# Patient Record
Sex: FEMALE | Race: WHITE
[De-identification: names, ages, dates, MRNs, and addresses within clinical notes are randomized per-mention and may not be internally consistent; named-entity substitution may affect disease eponyms.]

---

## 2019-08-22 ENCOUNTER — HOSPITAL ENCOUNTER (INPATIENT)
Dept: HOSPITAL 19 - MEDICAL | Age: 84
LOS: 5 days | Discharge: NURSING, REHAB OR LONG-TERM CARE | DRG: 853 | End: 2019-08-27
Attending: INTERNAL MEDICINE | Admitting: INTERNAL MEDICINE
Payer: MEDICAID

## 2019-08-22 VITALS — TEMPERATURE: 98.1 F | DIASTOLIC BLOOD PRESSURE: 78 MMHG | HEART RATE: 118 BPM | SYSTOLIC BLOOD PRESSURE: 148 MMHG

## 2019-08-22 VITALS — DIASTOLIC BLOOD PRESSURE: 46 MMHG | SYSTOLIC BLOOD PRESSURE: 92 MMHG | HEART RATE: 107 BPM

## 2019-08-22 VITALS — BODY MASS INDEX: 38.02 KG/M2 | HEIGHT: 65 IN | WEIGHT: 228.18 LBS

## 2019-08-22 VITALS — TEMPERATURE: 97.1 F | HEART RATE: 118 BPM | SYSTOLIC BLOOD PRESSURE: 148 MMHG | DIASTOLIC BLOOD PRESSURE: 78 MMHG

## 2019-08-22 VITALS — TEMPERATURE: 98 F | HEART RATE: 108 BPM | SYSTOLIC BLOOD PRESSURE: 112 MMHG | DIASTOLIC BLOOD PRESSURE: 78 MMHG

## 2019-08-22 VITALS — TEMPERATURE: 98 F | HEART RATE: 104 BPM | DIASTOLIC BLOOD PRESSURE: 77 MMHG | SYSTOLIC BLOOD PRESSURE: 134 MMHG

## 2019-08-22 VITALS — DIASTOLIC BLOOD PRESSURE: 72 MMHG | SYSTOLIC BLOOD PRESSURE: 135 MMHG | HEART RATE: 102 BPM | TEMPERATURE: 97.8 F

## 2019-08-22 DIAGNOSIS — A41.9: Primary | ICD-10-CM

## 2019-08-22 DIAGNOSIS — I44.0: ICD-10-CM

## 2019-08-22 DIAGNOSIS — N39.0: ICD-10-CM
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
## 2019-08-22 DIAGNOSIS — I50.9: ICD-10-CM

## 2019-08-22 DIAGNOSIS — Z79.1: ICD-10-CM

## 2019-08-22 DIAGNOSIS — N17.9: ICD-10-CM

## 2019-08-22 DIAGNOSIS — E11.9: ICD-10-CM

## 2019-08-22 DIAGNOSIS — R65.20: ICD-10-CM

## 2019-08-22 DIAGNOSIS — I10: ICD-10-CM

## 2019-08-22 DIAGNOSIS — J96.01: ICD-10-CM

## 2019-08-22 DIAGNOSIS — J18.9: ICD-10-CM

## 2019-08-22 DIAGNOSIS — N13.2: ICD-10-CM

## 2019-08-22 DIAGNOSIS — R53.81: ICD-10-CM

## 2019-08-22 DIAGNOSIS — Z66: ICD-10-CM

## 2019-08-22 DIAGNOSIS — I11.0: ICD-10-CM

## 2019-08-22 PROCEDURE — A9284 NON-ELECTRONIC SPIROMETER: HCPCS

## 2019-08-22 PROCEDURE — 0T768DZ DILATION OF RIGHT URETER WITH INTRALUMINAL DEVICE, VIA NATURAL OR ARTIFICIAL OPENING ENDOSCOPIC: ICD-10-PCS | Performed by: UROLOGY

## 2019-08-22 PROCEDURE — A4314 CATH W/DRAINAGE 2-WAY LATEX: HCPCS

## 2019-08-22 PROCEDURE — C1769 GUIDE WIRE: HCPCS

## 2019-08-22 PROCEDURE — C2617 STENT, NON-COR, TEM W/O DEL: HCPCS

## 2019-08-22 PROCEDURE — 0T778DZ DILATION OF LEFT URETER WITH INTRALUMINAL DEVICE, VIA NATURAL OR ARTIFICIAL OPENING ENDOSCOPIC: ICD-10-PCS | Performed by: UROLOGY

## 2019-08-22 PROCEDURE — BT1DYZZ FLUOROSCOPY OF RIGHT KIDNEY, URETER AND BLADDER USING OTHER CONTRAST: ICD-10-PCS | Performed by: UROLOGY

## 2019-08-23 VITALS — HEART RATE: 103 BPM | TEMPERATURE: 98 F | SYSTOLIC BLOOD PRESSURE: 114 MMHG | DIASTOLIC BLOOD PRESSURE: 63 MMHG

## 2019-08-23 VITALS — DIASTOLIC BLOOD PRESSURE: 83 MMHG | TEMPERATURE: 97.6 F | SYSTOLIC BLOOD PRESSURE: 152 MMHG | HEART RATE: 105 BPM

## 2019-08-23 VITALS — SYSTOLIC BLOOD PRESSURE: 115 MMHG | DIASTOLIC BLOOD PRESSURE: 68 MMHG | HEART RATE: 105 BPM

## 2019-08-23 VITALS — HEART RATE: 105 BPM | TEMPERATURE: 98.4 F | DIASTOLIC BLOOD PRESSURE: 73 MMHG | SYSTOLIC BLOOD PRESSURE: 144 MMHG

## 2019-08-23 VITALS — HEART RATE: 103 BPM | SYSTOLIC BLOOD PRESSURE: 108 MMHG | DIASTOLIC BLOOD PRESSURE: 61 MMHG

## 2019-08-23 VITALS — SYSTOLIC BLOOD PRESSURE: 142 MMHG | TEMPERATURE: 98.3 F | HEART RATE: 104 BPM | DIASTOLIC BLOOD PRESSURE: 76 MMHG

## 2019-08-23 VITALS — DIASTOLIC BLOOD PRESSURE: 75 MMHG | TEMPERATURE: 98.8 F | HEART RATE: 105 BPM | SYSTOLIC BLOOD PRESSURE: 146 MMHG

## 2019-08-23 VITALS — DIASTOLIC BLOOD PRESSURE: 65 MMHG | TEMPERATURE: 98 F | HEART RATE: 107 BPM | SYSTOLIC BLOOD PRESSURE: 118 MMHG

## 2019-08-23 VITALS — SYSTOLIC BLOOD PRESSURE: 124 MMHG | TEMPERATURE: 97.3 F | DIASTOLIC BLOOD PRESSURE: 62 MMHG | HEART RATE: 102 BPM

## 2019-08-23 LAB
ALBUMIN SERPL-MCNC: 3.3 GM/DL (ref 3.5–5)
ALP SERPL-CCNC: 71 U/L (ref 50–136)
ALT SERPL-CCNC: < 6 U/L (ref 9–52)
ANION GAP SERPL CALC-SCNC: 9 MMOL/L (ref 7–16)
AST SERPL-CCNC: 31 U/L (ref 15–37)
BILIRUB SERPL-MCNC: 1.1 MG/DL (ref 0–1)
BUN SERPL-MCNC: 35 MG/DL (ref 7–17)
CALCIUM SERPL-MCNC: 8.4 MG/DL (ref 8.4–10.2)
CHLORIDE SERPL-SCNC: 100 MMOL/L (ref 98–107)
CO2 SERPL-SCNC: 26 MMOL/L (ref 22–30)
CREAT SERPL-SCNC: 1.17 UMOL/L (ref 0.52–1.25)
ERYTHROCYTE [DISTWIDTH] IN BLOOD BY AUTOMATED COUNT: 14.1 % (ref 11.5–14.5)
GLUCOSE SERPL-MCNC: 193 MG/DL (ref 74–106)
HCT VFR BLD AUTO: 39.3 % (ref 37–47)
HGB BLD-MCNC: 13.1 G/DL (ref 12.5–16)
LYMPHOCYTES NFR BLD MANUAL: 3 % (ref 20–51)
MCH RBC QN AUTO: 32 PG (ref 27–31)
MCHC RBC AUTO-ENTMCNC: 33 G/DL (ref 33–37)
MCV RBC AUTO: 96 FL (ref 80–100)
MONOCYTES NFR BLD: 5 % (ref 1.7–9.3)
NEUTS SEG NFR BLD MANUAL: 92 % (ref 42–75.2)
PLATELET # BLD AUTO: 267 K/MM3 (ref 130–400)
PLATELET BLD QL SMEAR: NORMAL
PMV BLD AUTO: 9.6 FL (ref 7.4–10.4)
POTASSIUM SERPL-SCNC: 3.7 MMOL/L (ref 3.4–5)
PROT SERPL-MCNC: 6.4 GM/DL (ref 6.4–8.2)
RBC # BLD AUTO: 4.08 M/MM3 (ref 4.1–5.3)
SODIUM SERPL-SCNC: 136 MMOL/L (ref 137–145)

## 2019-08-23 NOTE — NUR
Margot from [facility] reports they can accept the pt upon discharge. SW will
continue to follow.

## 2019-08-23 NOTE — NUR
SW met with the patient to discuss a discharge plan. The pt lives in [facility] at [facility]. SW presented the patient choice form and pt's first and
only choice is [facility]. A copy was provided to the pt and original was
placed in the chart. The pt reports nursing assists with ADLs and pt uses a
walker. The pt was not sure who her [facility] was and [facility]. The pt does not have advanced directives in the
EMR. RASHMI requested copy from Margot at [facility]. SW to fax updates to
[facility]. SW will continue to follow to assist with discharge.

## 2019-08-23 NOTE — NUR
IV to right forarm discontinued d/t infiltration, catheter tip intact.  warm
pack applied to extremity. IV restarted to Left forarm.

## 2019-08-23 NOTE — NUR
Patient in bed resting. Alert and oriented to self. Shift assessment complete.
Patient denies pain at this time. Daughters at bedside. Paiz to dependent
drainage with clear yellow urine present in bag. SCDs to BLE. Denies further
needs at this time.

## 2019-08-23 NOTE — NUR
Report received. Assumed care for night shift. Assessment complete. VS stable.
Denies pain. IV to Left forearm is infiltrated-leaking. Antibiotics are
currently due-will administer after new site access. Apiz cath draining dark
yellow urine-sediment. Encouraged to drink fluids-provided with 240mls of
water. Tolerated well. Denies nausea. Discussed PM medications and plan of
care. Denies questions but states she is exhausted. Call light in reach. Bed
in low position/wheels locked. Bed alarm on. Will monitor.

## 2019-08-23 NOTE — NUR
Patient has done well throughout the day. Paiz maintained to dependent
drainage with  clear yellow urine present in bag. Denies pain at this time. IV
fluids infusing to left forarm IV. Denies further needs at this time. Will
report off to night shift.

## 2019-08-24 VITALS — HEART RATE: 104 BPM | TEMPERATURE: 98.6 F | SYSTOLIC BLOOD PRESSURE: 148 MMHG | DIASTOLIC BLOOD PRESSURE: 80 MMHG

## 2019-08-24 VITALS — DIASTOLIC BLOOD PRESSURE: 79 MMHG | TEMPERATURE: 98.4 F | SYSTOLIC BLOOD PRESSURE: 153 MMHG | HEART RATE: 97 BPM

## 2019-08-24 VITALS — SYSTOLIC BLOOD PRESSURE: 157 MMHG | DIASTOLIC BLOOD PRESSURE: 87 MMHG | HEART RATE: 91 BPM | TEMPERATURE: 97.8 F

## 2019-08-24 VITALS — DIASTOLIC BLOOD PRESSURE: 88 MMHG | HEART RATE: 97 BPM | SYSTOLIC BLOOD PRESSURE: 174 MMHG | TEMPERATURE: 98.2 F

## 2019-08-24 VITALS — SYSTOLIC BLOOD PRESSURE: 156 MMHG | TEMPERATURE: 98.3 F | HEART RATE: 101 BPM | DIASTOLIC BLOOD PRESSURE: 85 MMHG

## 2019-08-24 VITALS — TEMPERATURE: 98.1 F | DIASTOLIC BLOOD PRESSURE: 94 MMHG | SYSTOLIC BLOOD PRESSURE: 159 MMHG | HEART RATE: 96 BPM

## 2019-08-24 LAB
ALBUMIN SERPL-MCNC: 3.1 GM/DL (ref 3.5–5)
ALP SERPL-CCNC: 69 U/L (ref 50–136)
ALT SERPL-CCNC: < 6 U/L (ref 9–52)
ANION GAP SERPL CALC-SCNC: 9 MMOL/L (ref 7–16)
AST SERPL-CCNC: 21 U/L (ref 15–37)
BASOPHILS # BLD: 0 10*3/UL (ref 0–0.2)
BASOPHILS NFR BLD AUTO: 0.2 % (ref 0–2)
BILIRUB SERPL-MCNC: 1 MG/DL (ref 0–1)
BUN SERPL-MCNC: 26 MG/DL (ref 7–17)
CALCIUM SERPL-MCNC: 8.3 MG/DL (ref 8.4–10.2)
CHLORIDE SERPL-SCNC: 100 MMOL/L (ref 98–107)
CO2 SERPL-SCNC: 28 MMOL/L (ref 22–30)
CREAT SERPL-SCNC: 0.87 UMOL/L (ref 0.52–1.25)
EOSINOPHIL # BLD: 0 10*3/UL (ref 0–0.7)
EOSINOPHIL NFR BLD: 0 % (ref 0–4)
ERYTHROCYTE [DISTWIDTH] IN BLOOD BY AUTOMATED COUNT: 13.8 % (ref 11.5–14.5)
GLUCOSE SERPL-MCNC: 147 MG/DL (ref 74–106)
GRANULOCYTES # BLD AUTO: 89.9 % (ref 42.2–75.2)
HCT VFR BLD AUTO: 38.4 % (ref 37–47)
HGB BLD-MCNC: 13 G/DL (ref 12.5–16)
LYMPHOCYTES # BLD: 0.7 10*3/UL (ref 1.2–3.4)
LYMPHOCYTES NFR BLD: 3.2 % (ref 20–51)
MCH RBC QN AUTO: 32 PG (ref 27–31)
MCHC RBC AUTO-ENTMCNC: 34 G/DL (ref 33–37)
MCV RBC AUTO: 94 FL (ref 80–100)
MONOCYTES # BLD: 1.1 10*3/UL (ref 0.1–0.6)
MONOCYTES NFR BLD AUTO: 5.4 % (ref 1.7–9.3)
NEUTROPHILS # BLD: 18.8 10*3/UL (ref 1.4–6.5)
PLATELET # BLD AUTO: 250 K/MM3 (ref 130–400)
PMV BLD AUTO: 9.3 FL (ref 7.4–10.4)
POTASSIUM SERPL-SCNC: 3.1 MMOL/L (ref 3.4–5)
PROT SERPL-MCNC: 6.2 GM/DL (ref 6.4–8.2)
RBC # BLD AUTO: 4.07 M/MM3 (ref 4.1–5.3)
SODIUM SERPL-SCNC: 136 MMOL/L (ref 137–145)

## 2019-08-24 NOTE — NUR
Patient has done well throughout the day. Sat up in recliner most of the day.
Paiz maintained to dependent drainage with cloudy yellow urine present in
bag. Refuses supper this afternoon, states she is not hungry. Denies pain or
further needs at this time. Will report off to night shift.

## 2019-08-24 NOTE — NUR
Pt. laying in bed at this time.  Pt. is alert to self and pleasently confused.
Assessment complete.  IV to rt. forearm patent, IV fluids infusing per orders.
Paiz catheter to DD, cloudy yellow urine noted.  Pt. denies pain or other
needs, call light within reach.

## 2019-08-24 NOTE — NUR
Has rested well this shift. VS remained stable. Denies pain. No nausea. Paiz
with dark yellow urine-less sediment this shift. Remained oriented to self.
Bed alarm on/call light within reach. Will monitor.

## 2019-08-25 VITALS — HEART RATE: 87 BPM | TEMPERATURE: 98 F | DIASTOLIC BLOOD PRESSURE: 72 MMHG | SYSTOLIC BLOOD PRESSURE: 139 MMHG

## 2019-08-25 VITALS — DIASTOLIC BLOOD PRESSURE: 74 MMHG | TEMPERATURE: 98.1 F | SYSTOLIC BLOOD PRESSURE: 146 MMHG | HEART RATE: 95 BPM

## 2019-08-25 VITALS — TEMPERATURE: 98 F | DIASTOLIC BLOOD PRESSURE: 74 MMHG | SYSTOLIC BLOOD PRESSURE: 139 MMHG | HEART RATE: 93 BPM

## 2019-08-25 VITALS — DIASTOLIC BLOOD PRESSURE: 85 MMHG | TEMPERATURE: 97.8 F | HEART RATE: 89 BPM | SYSTOLIC BLOOD PRESSURE: 158 MMHG

## 2019-08-25 VITALS — HEART RATE: 85 BPM | SYSTOLIC BLOOD PRESSURE: 148 MMHG | DIASTOLIC BLOOD PRESSURE: 78 MMHG | TEMPERATURE: 97.6 F

## 2019-08-25 VITALS — DIASTOLIC BLOOD PRESSURE: 78 MMHG | SYSTOLIC BLOOD PRESSURE: 154 MMHG | TEMPERATURE: 98.3 F | HEART RATE: 94 BPM

## 2019-08-25 LAB
ALBUMIN SERPL-MCNC: 3.1 GM/DL (ref 3.5–5)
ALP SERPL-CCNC: 71 U/L (ref 50–136)
ALT SERPL-CCNC: < 6 U/L (ref 9–52)
ANION GAP SERPL CALC-SCNC: 8 MMOL/L (ref 7–16)
AST SERPL-CCNC: 18 U/L (ref 15–37)
BASOPHILS # BLD: 0.1 10*3/UL (ref 0–0.2)
BASOPHILS NFR BLD AUTO: 0.4 % (ref 0–2)
BILIRUB SERPL-MCNC: 0.9 MG/DL (ref 0–1)
BUN SERPL-MCNC: 23 MG/DL (ref 7–17)
CALCIUM SERPL-MCNC: 8.1 MG/DL (ref 8.4–10.2)
CHLORIDE SERPL-SCNC: 97 MMOL/L (ref 98–107)
CO2 SERPL-SCNC: 30 MMOL/L (ref 22–30)
CREAT SERPL-SCNC: 0.92 UMOL/L (ref 0.52–1.25)
EOSINOPHIL # BLD: 0.1 10*3/UL (ref 0–0.7)
EOSINOPHIL NFR BLD: 0.3 % (ref 0–4)
ERYTHROCYTE [DISTWIDTH] IN BLOOD BY AUTOMATED COUNT: 13.7 % (ref 11.5–14.5)
GLUCOSE SERPL-MCNC: 148 MG/DL (ref 74–106)
GRANULOCYTES # BLD AUTO: 83.4 % (ref 42.2–75.2)
HCT VFR BLD AUTO: 39 % (ref 37–47)
HGB BLD-MCNC: 12.9 G/DL (ref 12.5–16)
LYMPHOCYTES # BLD: 1 10*3/UL (ref 1.2–3.4)
LYMPHOCYTES NFR BLD: 6.5 % (ref 20–51)
MCH RBC QN AUTO: 32 PG (ref 27–31)
MCHC RBC AUTO-ENTMCNC: 33 G/DL (ref 33–37)
MCV RBC AUTO: 96 FL (ref 80–100)
MONOCYTES # BLD: 1 10*3/UL (ref 0.1–0.6)
MONOCYTES NFR BLD AUTO: 6.9 % (ref 1.7–9.3)
NEUTROPHILS # BLD: 12.4 10*3/UL (ref 1.4–6.5)
PLATELET # BLD AUTO: 278 K/MM3 (ref 130–400)
PMV BLD AUTO: 9.7 FL (ref 7.4–10.4)
POTASSIUM SERPL-SCNC: 3.2 MMOL/L (ref 3.4–5)
PROT SERPL-MCNC: 6.3 GM/DL (ref 6.4–8.2)
RBC # BLD AUTO: 4.08 M/MM3 (ref 4.1–5.3)
SODIUM SERPL-SCNC: 134 MMOL/L (ref 137–145)

## 2019-08-25 NOTE — NUR
Afebrile. No complaints. Fluids encouraged. States doesn't have much appetite.
Urine less cloudy today. IV fluids and IV antibiotic infusing. Family
visiting.

## 2019-08-25 NOTE — NUR
Pt. laying in bed at this time.  Pt. is alert and pleasently confused.  IV to
lt. hand, IV fluids infusing per orders.  Pt. denies pain or other needs, call
light within reach.

## 2019-08-26 VITALS — SYSTOLIC BLOOD PRESSURE: 128 MMHG | HEART RATE: 94 BPM | DIASTOLIC BLOOD PRESSURE: 74 MMHG | TEMPERATURE: 98 F

## 2019-08-26 VITALS — TEMPERATURE: 98.1 F | DIASTOLIC BLOOD PRESSURE: 84 MMHG | HEART RATE: 86 BPM | SYSTOLIC BLOOD PRESSURE: 162 MMHG

## 2019-08-26 VITALS — SYSTOLIC BLOOD PRESSURE: 146 MMHG | TEMPERATURE: 97.8 F | DIASTOLIC BLOOD PRESSURE: 82 MMHG | HEART RATE: 87 BPM

## 2019-08-26 VITALS — DIASTOLIC BLOOD PRESSURE: 83 MMHG | TEMPERATURE: 98.2 F | SYSTOLIC BLOOD PRESSURE: 148 MMHG | HEART RATE: 100 BPM

## 2019-08-26 VITALS — SYSTOLIC BLOOD PRESSURE: 149 MMHG | TEMPERATURE: 98.4 F | DIASTOLIC BLOOD PRESSURE: 83 MMHG | HEART RATE: 93 BPM

## 2019-08-26 NOTE — NUR
RASHMI attended clinical rounds. The patient is to continue IV antibiotics and
will have her guerrero catheter removed today. The hospitalist discussed a
tentative discharge for tomorrow, 8/27. RASHMI contacted and updated Margot at
[facility] [place]. SW to fax updates to [facility] and will
continue to follow.

## 2019-08-26 NOTE — NUR
PATIENT'S ARGUETA CATHETER DISCONTINUED PRE DR. PEREYRA. 9 MLS OF STERILE SALINE
ASPIRATED FROM BALLOON. TIP INTACT. PATIENT TOLERATED WELL. [facility]
PROVIDED. PATIENT DENIES ANY NEEDS AT THIS TIME.

## 2019-08-26 NOTE — NUR
PATIENT IS SITTING UP IN BED THIS MORNING. PATIENT IS ALERT AND ORIENTED TO
NAME, BIRTHDATE, THAT SHE IS IN A MEDICAL BUILDING IN [place]. TACHYCARDIA
WITH HEART MURMUR NOTED, OTHERWISE VSS. TELE IN PLACE. GENERALIZED WEAKNESS
NOTED. BOWEL SOUNDS ACTIVE ALL FOUR QUADRANTS. PATIENT TOLERATING DIET.
SHALLOW BREATHING NOTED. UPPER LUNG LOBES CLEAR UPON AUSCULTATION. LUNG BASES
DIMINISHED BILATERALLY. PATIENT DENIES SOB OR A PRODUCTIVE COUGH. POSITIVE
PEDAL PULSES EQUAL BILATERALLY. EDEMA TO BLE. ARGUETA CATHETER TO DEPENDENT
DRAINAGE WITH MODERATE AMOUNTS OF CLEAR, PALE YELLOW URINE PRESENT IN ARGUETA
BAG. IV FLUIDS INFUSING TO LEFT HAND IV VIA PUMP. CALL LIGHT WITHIN REACH.
PATIENT DENIES ANY NEEDS AT THIS TIME.

## 2019-08-26 NOTE — NUR
Pt. sitting up in bed at this time.  Pt. is alert and pleasently confused.
INT to lt. hand patent.  Pt. denies pain or other needs, call light within
reach.

## 2019-08-27 VITALS — HEART RATE: 89 BPM | DIASTOLIC BLOOD PRESSURE: 82 MMHG | SYSTOLIC BLOOD PRESSURE: 155 MMHG | TEMPERATURE: 98.3 F

## 2019-08-27 VITALS — HEART RATE: 91 BPM | SYSTOLIC BLOOD PRESSURE: 136 MMHG | DIASTOLIC BLOOD PRESSURE: 78 MMHG | TEMPERATURE: 98 F

## 2019-08-27 VITALS — DIASTOLIC BLOOD PRESSURE: 74 MMHG | TEMPERATURE: 98.1 F | HEART RATE: 81 BPM | SYSTOLIC BLOOD PRESSURE: 134 MMHG

## 2019-08-27 VITALS — DIASTOLIC BLOOD PRESSURE: 74 MMHG | TEMPERATURE: 98.1 F | SYSTOLIC BLOOD PRESSURE: 134 MMHG | HEART RATE: 81 BPM

## 2019-08-27 VITALS — DIASTOLIC BLOOD PRESSURE: 82 MMHG | TEMPERATURE: 98.3 F | HEART RATE: 91 BPM | SYSTOLIC BLOOD PRESSURE: 145 MMHG

## 2019-08-27 LAB
ANION GAP SERPL CALC-SCNC: 7 MMOL/L (ref 7–16)
BUN SERPL-MCNC: 19 MG/DL (ref 7–17)
CALCIUM SERPL-MCNC: 8.2 MG/DL (ref 8.4–10.2)
CHLORIDE SERPL-SCNC: 96 MMOL/L (ref 98–107)
CO2 SERPL-SCNC: 30 MMOL/L (ref 22–30)
CREAT SERPL-SCNC: 0.79 UMOL/L (ref 0.52–1.25)
EOSINOPHIL NFR BLD: 1 % (ref 0–4)
ERYTHROCYTE [DISTWIDTH] IN BLOOD BY AUTOMATED COUNT: 13.5 % (ref 11.5–14.5)
GLUCOSE SERPL-MCNC: 156 MG/DL (ref 74–106)
HCT VFR BLD AUTO: 42.4 % (ref 37–47)
HGB BLD-MCNC: 13.9 G/DL (ref 12.5–16)
HYPOCHROMIA BLD QL SMEAR: (no result)
LYMPHOCYTES NFR BLD MANUAL: 11 % (ref 20–51)
MCH RBC QN AUTO: 31 PG (ref 27–31)
MCHC RBC AUTO-ENTMCNC: 33 G/DL (ref 33–37)
MCV RBC AUTO: 96 FL (ref 80–100)
MONOCYTES NFR BLD: 7 % (ref 1.7–9.3)
MYELOCYTES NFR BLD MANUAL: 11 % (ref 0–0)
NEUTS SEG NFR BLD MANUAL: 70 % (ref 42–75.2)
PLATELET # BLD AUTO: 318 K/MM3 (ref 130–400)
PLATELET BLD QL SMEAR: NORMAL
PMV BLD AUTO: 9.4 FL (ref 7.4–10.4)
POTASSIUM SERPL-SCNC: 4.3 MMOL/L (ref 3.4–5)
RBC # BLD AUTO: 4.44 M/MM3 (ref 4.1–5.3)
SODIUM SERPL-SCNC: 133 MMOL/L (ref 137–145)

## 2019-08-27 NOTE — NUR
Patient alert and oriented, answers questions appropriately.  See assessment.
Lungs decreased in bases, clear in upper lobes.  No c/o SOA, oxygen at 2l/nc.
No accessory muscle use noted.  No c/o urinary burning, frequency or
hesitancy.  No other c/o at this time.

## 2019-08-27 NOTE — NUR
The patient is to discharge today, 8/27, back to [facility]
for long-term care with PT/OT to eval and treat. Transportation was scheduled
for 1300, via [facility]. SW informed the patient, patient's nurse, and the
patient's daughter (Jamilah Smith) via phone. They were all in agreeance to
discharge and transportation time. No additional needs at this time.

## 2019-09-19 ENCOUNTER — HOSPITAL ENCOUNTER (OUTPATIENT)
Dept: HOSPITAL 19 - SDCO | Age: 84
LOS: 1 days | Discharge: HOME | End: 2019-09-20
Attending: UROLOGY
Payer: MEDICAID

## 2019-09-19 VITALS — TEMPERATURE: 97.4 F | DIASTOLIC BLOOD PRESSURE: 71 MMHG | HEART RATE: 87 BPM | SYSTOLIC BLOOD PRESSURE: 122 MMHG

## 2019-09-19 VITALS — SYSTOLIC BLOOD PRESSURE: 122 MMHG | DIASTOLIC BLOOD PRESSURE: 66 MMHG | HEART RATE: 80 BPM | TEMPERATURE: 97.4 F

## 2019-09-19 VITALS — SYSTOLIC BLOOD PRESSURE: 99 MMHG | HEART RATE: 91 BPM | TEMPERATURE: 97.9 F | DIASTOLIC BLOOD PRESSURE: 47 MMHG

## 2019-09-19 VITALS — DIASTOLIC BLOOD PRESSURE: 83 MMHG | HEART RATE: 79 BPM | TEMPERATURE: 97.7 F | SYSTOLIC BLOOD PRESSURE: 110 MMHG

## 2019-09-19 VITALS — SYSTOLIC BLOOD PRESSURE: 111 MMHG | TEMPERATURE: 97 F | HEART RATE: 86 BPM | DIASTOLIC BLOOD PRESSURE: 59 MMHG

## 2019-09-19 VITALS — HEART RATE: 84 BPM | TEMPERATURE: 98.2 F | SYSTOLIC BLOOD PRESSURE: 141 MMHG | DIASTOLIC BLOOD PRESSURE: 74 MMHG

## 2019-09-19 VITALS — SYSTOLIC BLOOD PRESSURE: 126 MMHG | TEMPERATURE: 98.1 F | DIASTOLIC BLOOD PRESSURE: 56 MMHG | HEART RATE: 83 BPM

## 2019-09-19 VITALS — TEMPERATURE: 97.7 F | SYSTOLIC BLOOD PRESSURE: 117 MMHG | DIASTOLIC BLOOD PRESSURE: 61 MMHG | HEART RATE: 81 BPM

## 2019-09-19 VITALS — TEMPERATURE: 97.5 F | DIASTOLIC BLOOD PRESSURE: 79 MMHG | SYSTOLIC BLOOD PRESSURE: 114 MMHG | HEART RATE: 88 BPM

## 2019-09-19 VITALS — BODY MASS INDEX: 39.33 KG/M2 | WEIGHT: 230.38 LBS | HEIGHT: 64.02 IN

## 2019-09-19 DIAGNOSIS — N20.2: Primary | ICD-10-CM

## 2019-09-19 DIAGNOSIS — E11.9: ICD-10-CM

## 2019-09-19 DIAGNOSIS — E66.9: ICD-10-CM

## 2019-09-19 DIAGNOSIS — Z79.84: ICD-10-CM

## 2019-09-19 DIAGNOSIS — I50.9: ICD-10-CM

## 2019-09-19 DIAGNOSIS — Z86.73: ICD-10-CM

## 2019-09-19 DIAGNOSIS — F03.90: ICD-10-CM

## 2019-09-19 DIAGNOSIS — Z87.440: ICD-10-CM

## 2019-09-19 DIAGNOSIS — I11.0: ICD-10-CM

## 2019-09-19 PROCEDURE — C1769 GUIDE WIRE: HCPCS

## 2019-09-19 PROCEDURE — C2617 STENT, NON-COR, TEM W/O DEL: HCPCS

## 2019-09-19 NOTE — NUR
Patient to room 323 by bed. Patient is A&O and requesting to use the BSC. 2
nurses assisted patient to BSC. Oriented the patient to location, bed and call
light. Assessment complete. Patient denies pain and discomfort. No further
needs expressed from patient. Call light within reach. Bed alarm on

## 2019-09-19 NOTE — NUR
Patient is awake and alert in room, head of bed is elevated and she just
finished eating supper.  She denies having any pain.  Respirations are even
and non-labored.  She is on oxygen and saturations are 94-95% on 1L via NC.
Did turn oxygen off for period of time and saturations remained in the mid 90%
and she did deny having any shortness of breath.  Patient was assisted to
commode with assistance of one staff and walker.  Did pass 20ml of blood
tinged urine.  Denies pain whlie urinating.  There is a stent string noted to
be coming from urethra.  Anne care provided and patient is assisted back to
bed.  Has personal items and call light within reach.

## 2019-09-19 NOTE — NUR
Patient has been having urinary frequecy, bladder scan was complete with 150
ml noted in bladder.  When patient is getting up she is noted to have 10-15ml
output of blood tinged urine each time.  Did place call to provider and
inquired if needed would patient be able to be straight cathed if needed with
the stent string in place.  Was informed she could be but he states patient is
most likely having bladder spasms and to utilize the PRN antispasmotic.  This
was adminsitered and patient has been resting for 30-40 minutes without
interruption.  Call light and personal items are within reach.

## 2019-09-20 VITALS — SYSTOLIC BLOOD PRESSURE: 135 MMHG | DIASTOLIC BLOOD PRESSURE: 75 MMHG | TEMPERATURE: 98.6 F | HEART RATE: 88 BPM

## 2019-09-20 VITALS — SYSTOLIC BLOOD PRESSURE: 131 MMHG | TEMPERATURE: 98.5 F | HEART RATE: 118 BPM | DIASTOLIC BLOOD PRESSURE: 77 MMHG

## 2019-09-20 VITALS — TEMPERATURE: 98.4 F | DIASTOLIC BLOOD PRESSURE: 64 MMHG | HEART RATE: 94 BPM | SYSTOLIC BLOOD PRESSURE: 117 MMHG

## 2019-09-20 VITALS — HEART RATE: 94 BPM | DIASTOLIC BLOOD PRESSURE: 64 MMHG | TEMPERATURE: 98.4 F | SYSTOLIC BLOOD PRESSURE: 117 MMHG

## 2019-09-20 VITALS — TEMPERATURE: 98.8 F | SYSTOLIC BLOOD PRESSURE: 177 MMHG | DIASTOLIC BLOOD PRESSURE: 95 MMHG | HEART RATE: 104 BPM

## 2019-09-20 NOTE — NUR
Patient has been resting in bed, urinary frequency has improved.  Urine is
still a red color when she does get up.  Call light and personal items are
within reach.

## 2019-09-20 NOTE — NUR
Assisted patient to bedside commode.  Patient voided clear, pale pink urine.
Small amount of urine.  Spasms have continued to be decreased.  Pericare
provided and assisted back to bed.

## 2019-09-20 NOTE — NUR
IV removed from right hand, tip intact, gauze and coban applied. Patient
transfered to the patients own wheelchair with 1xassist in the hospital gown.
Discharge paperwork and personal belongings with patient. Report called to
[facility]. No further needs expressed from patient. [employer] employee
and  with patient

## 2019-09-20 NOTE — NUR
Assessment complete. Patient is A&Ox4. VSS 2L O2 NC. No reported SOB.  IV CDI
coban covering. Denies pain, discomfort, N/V. Patient is reporting that she is
voiding more frequently. Nursing staff assisting with ambulation to BSC. No
further needs expressed from patient. Call light within reach. Bed alarm on

## 2019-09-20 NOTE — NUR
RASHMI informed that patient has discharge orders to return to the nursing home.
RASHMI met with patient to discuss discharge. Patient is a long term care patient
at [facility] in [place]. Patient's PCP is Dr Brett Siegle and she
obtains medications from the [facility] or [facility]. Patient requested
SW contact her daughter, Klarissa, to inform her of discharge.
 
RASHMI, then, contacted Latha from [facility] and she reports that they will
provide transportation at 1pm.  RASHMI faxed discharge orders. RASHMI also contacted
patient's daughter and informed her of discharge at 1pm.